# Patient Record
Sex: MALE | Race: WHITE | Employment: UNEMPLOYED | URBAN - METROPOLITAN AREA
[De-identification: names, ages, dates, MRNs, and addresses within clinical notes are randomized per-mention and may not be internally consistent; named-entity substitution may affect disease eponyms.]

---

## 2018-03-22 RX ORDER — DOCUSATE SODIUM 100 MG/1
100 CAPSULE, LIQUID FILLED ORAL 2 TIMES DAILY
COMMUNITY

## 2018-03-22 RX ORDER — TAMSULOSIN HYDROCHLORIDE 0.4 MG/1
0.4 CAPSULE ORAL DAILY
COMMUNITY

## 2018-03-22 RX ORDER — SENNA PLUS 8.6 MG/1
1 TABLET ORAL DAILY
COMMUNITY

## 2018-03-22 RX ORDER — ATORVASTATIN CALCIUM 80 MG/1
80 TABLET, FILM COATED ORAL DAILY
COMMUNITY

## 2018-03-22 RX ORDER — M-VIT,TX,IRON,MINS/CALC/FOLIC 27MG-0.4MG
1 TABLET ORAL DAILY
COMMUNITY

## 2018-03-23 ENCOUNTER — OFFICE VISIT (OUTPATIENT)
Dept: VASCULAR SURGERY | Age: 73
End: 2018-03-23

## 2018-03-23 VITALS
BODY MASS INDEX: 22.12 KG/M2 | HEIGHT: 71 IN | DIASTOLIC BLOOD PRESSURE: 64 MMHG | SYSTOLIC BLOOD PRESSURE: 110 MMHG | WEIGHT: 158 LBS

## 2018-03-23 DIAGNOSIS — I71.40 AAA (ABDOMINAL AORTIC ANEURYSM) WITHOUT RUPTURE: Primary | ICD-10-CM

## 2018-03-23 PROCEDURE — 1036F TOBACCO NON-USER: CPT | Performed by: SURGERY

## 2018-03-23 PROCEDURE — 1123F ACP DISCUSS/DSCN MKR DOCD: CPT | Performed by: SURGERY

## 2018-03-23 PROCEDURE — 3017F COLORECTAL CA SCREEN DOC REV: CPT | Performed by: SURGERY

## 2018-03-23 PROCEDURE — G8484 FLU IMMUNIZE NO ADMIN: HCPCS | Performed by: SURGERY

## 2018-03-23 PROCEDURE — G8420 CALC BMI NORM PARAMETERS: HCPCS | Performed by: SURGERY

## 2018-03-23 PROCEDURE — 4040F PNEUMOC VAC/ADMIN/RCVD: CPT | Performed by: SURGERY

## 2018-03-23 PROCEDURE — G8427 DOCREV CUR MEDS BY ELIG CLIN: HCPCS | Performed by: SURGERY

## 2018-03-23 PROCEDURE — 99203 OFFICE O/P NEW LOW 30 MIN: CPT | Performed by: SURGERY

## 2018-03-23 NOTE — PROGRESS NOTES
[facility])  Consultation/History & Physical    Date of Admission:  (Not on file)  Date of Consultation:  3/23/2018    PCP:  Maxx Caban MD     Chief Complaint:    Chief Complaint   Patient presents with    New Patient           History of Present Illness:  Kyle Casillas is a 68 y.o. male who presents with History of coronary artery disease, renal insufficiency, congestive heart failure, hypertension and hyperlipidemia. He presents in referral from his nursing facility where he is currently residing following a right hip fracture. Patient underwent a CT scan of the abdomen and pelvis without contrast for a history of hydronephrosis. On that CT scan. He was noted to have a 4.5 cm infrarenal abdominal aortic aneurysm and is referred here for further evaluation. The patient has a long history of tobacco abuse, however, does not currently smoke. No family history of aneurysmal disease. Denies abdominal or back pain. His progressing well through therapy at his extended nursing facility and should be released shortly. PMH:   has a past medical history of Arthritis; CAD (coronary artery disease); Cerebral artery occlusion with cerebral infarction [facility]); CHF (congestive heart failure) ([facility] Depression; Hyperlipidemia; Hypertension; Kidney stone; Prostate enlargement; Sleep apnea; and [facility] PSH:   has a past surgical history that includes knee surgery; Coronary artery bypass graft; Aortic valve replacement; Cholecystectomy; Cardiac surgery; and other surgical history (2015). Allergies:  No Known Allergies     Home Meds:    Prior to Admission medications    Medication Sig Start Date End Date Taking?  Authorizing Provider   Multiple Vitamins-Minerals (THERAPEUTIC MULTIVITAMIN-MINERALS) tablet Take 1 tablet by mouth daily   Yes Historical Provider, MD   atorvastatin (LIPITOR) 80 MG tablet Take 80 mg by mouth daily   Yes Historical Provider, MD   Calcium Carb-Cholecalciferol (CALCIUM-VITAMIN D) 500-200 MG-UNIT per tablet Take 1 tablet by mouth 2 times daily (with meals)   Yes Historical Provider, MD   senna (SENOKOT) 8.6 MG tablet Take 1 tablet by mouth daily   Yes Historical Provider, MD   tamsulosin (FLOMAX) 0.4 MG capsule Take 0.4 mg by mouth daily   Yes Historical Provider, MD   docusate sodium (COLACE) 100 MG capsule Take 100 mg by mouth 2 times daily   Yes Historical Provider, MD   apixaban (ELIQUIS) 5 MG TABS tablet Take by mouth 2 times daily   Yes Historical Provider, MD   ALBUTEROL SULFATE PO Take by mouth   Yes Historical Provider, MD   aspirin 81 MG tablet Take 81 mg by mouth daily   Yes Historical Provider, MD   Carvedilol (COREG PO) Take 25 mg by mouth daily Indications: takes 1/2 in the monrning and 1/2 at night    Yes Historical Provider, MD        Hospital Meds:    Current Outpatient Prescriptions   Medication Sig Dispense Refill    Multiple Vitamins-Minerals (THERAPEUTIC MULTIVITAMIN-MINERALS) tablet Take 1 tablet by mouth daily      atorvastatin (LIPITOR) 80 MG tablet Take 80 mg by mouth daily      Calcium Carb-Cholecalciferol (CALCIUM-VITAMIN D) 500-200 MG-UNIT per tablet Take 1 tablet by mouth 2 times daily (with meals)      senna (SENOKOT) 8.6 MG tablet Take 1 tablet by mouth daily      tamsulosin (FLOMAX) 0.4 MG capsule Take 0.4 mg by mouth daily      docusate sodium (COLACE) 100 MG capsule Take 100 mg by mouth 2 times daily      apixaban (ELIQUIS) 5 MG TABS tablet Take by mouth 2 times daily      ALBUTEROL SULFATE PO Take by mouth      aspirin 81 MG tablet Take 81 mg by mouth daily      Carvedilol (COREG PO) Take 25 mg by mouth daily Indications: takes 1/2 in the monrning and 1/2 at night        No current facility-administered medications for this visit.         Social History:       Social History     Social History    Marital status:      Spouse name: N/A    Number of children: N/A    Years of education: N/A     Social History Main Topics    Smoking status:

## 2018-08-30 ENCOUNTER — OFFICE VISIT (OUTPATIENT)
Dept: INFECTIOUS DISEASES | Age: 73
End: 2018-08-30

## 2018-08-30 VITALS — DIASTOLIC BLOOD PRESSURE: 58 MMHG | HEART RATE: 60 BPM | SYSTOLIC BLOOD PRESSURE: 84 MMHG | RESPIRATION RATE: 16 BRPM

## 2018-08-30 DIAGNOSIS — N39.0 COMPLICATED URINARY TRACT INFECTION: Primary | ICD-10-CM

## 2018-08-30 DIAGNOSIS — N39.0 URINARY TRACT INFECTION DUE TO PROTEUS: ICD-10-CM

## 2018-08-30 DIAGNOSIS — B96.4 URINARY TRACT INFECTION DUE TO PROTEUS: ICD-10-CM

## 2018-08-30 DIAGNOSIS — N40.1 BENIGN PROSTATIC HYPERPLASIA WITH URINARY RETENTION: ICD-10-CM

## 2018-08-30 DIAGNOSIS — Z87.891 EX-SMOKER: ICD-10-CM

## 2018-08-30 DIAGNOSIS — R33.8 BENIGN PROSTATIC HYPERPLASIA WITH URINARY RETENTION: ICD-10-CM

## 2018-08-30 DIAGNOSIS — Z87.442 HISTORY OF NEPHROLITHIASIS: ICD-10-CM

## 2018-08-30 DIAGNOSIS — B96.5 PSEUDOMONAS URINARY TRACT INFECTION: ICD-10-CM

## 2018-08-30 DIAGNOSIS — I25.83 CORONARY ARTERY DISEASE DUE TO LIPID RICH PLAQUE: ICD-10-CM

## 2018-08-30 DIAGNOSIS — I50.9 CHRONIC CONGESTIVE HEART FAILURE, UNSPECIFIED HEART FAILURE TYPE (HCC): ICD-10-CM

## 2018-08-30 DIAGNOSIS — N39.0 PSEUDOMONAS URINARY TRACT INFECTION: ICD-10-CM

## 2018-08-30 DIAGNOSIS — N13.5 OBSTRUCTION OF RIGHT URETEROPELVIC JUNCTION (UPJ): ICD-10-CM

## 2018-08-30 DIAGNOSIS — E78.2 MIXED HYPERLIPIDEMIA: ICD-10-CM

## 2018-08-30 DIAGNOSIS — Z86.73 H/O: STROKE: ICD-10-CM

## 2018-08-30 DIAGNOSIS — I25.10 CORONARY ARTERY DISEASE DUE TO LIPID RICH PLAQUE: ICD-10-CM

## 2018-08-30 DIAGNOSIS — N39.0 COMPLICATED URINARY TRACT INFECTION: ICD-10-CM

## 2018-08-30 DIAGNOSIS — Z71.6 TOBACCO ABUSE COUNSELING: ICD-10-CM

## 2018-08-30 DIAGNOSIS — F32.A CHRONIC DEPRESSION: ICD-10-CM

## 2018-08-30 DIAGNOSIS — G47.33 OSA (OBSTRUCTIVE SLEEP APNEA): ICD-10-CM

## 2018-08-30 LAB
A/G RATIO: 0.9 (ref 1.1–2.2)
ALBUMIN SERPL-MCNC: 3.4 G/DL (ref 3.4–5)
ALP BLD-CCNC: 94 U/L (ref 40–129)
ALT SERPL-CCNC: 10 U/L (ref 10–40)
ANION GAP SERPL CALCULATED.3IONS-SCNC: 11 MMOL/L (ref 3–16)
AST SERPL-CCNC: 16 U/L (ref 15–37)
BASOPHILS ABSOLUTE: 0 K/UL (ref 0–0.2)
BASOPHILS RELATIVE PERCENT: 0.4 %
BILIRUB SERPL-MCNC: 0.4 MG/DL (ref 0–1)
BUN BLDV-MCNC: 18 MG/DL (ref 7–20)
CALCIUM SERPL-MCNC: 9.7 MG/DL (ref 8.3–10.6)
CHLORIDE BLD-SCNC: 102 MMOL/L (ref 99–110)
CO2: 29 MMOL/L (ref 21–32)
CREAT SERPL-MCNC: 0.9 MG/DL (ref 0.8–1.3)
EOSINOPHILS ABSOLUTE: 0.1 K/UL (ref 0–0.6)
EOSINOPHILS RELATIVE PERCENT: 1.9 %
GFR AFRICAN AMERICAN: >60
GFR NON-AFRICAN AMERICAN: >60
GLOBULIN: 4 G/DL
GLUCOSE BLD-MCNC: 175 MG/DL (ref 70–99)
HCT VFR BLD CALC: 36.1 % (ref 40.5–52.5)
HEMOGLOBIN: 11.7 G/DL (ref 13.5–17.5)
LYMPHOCYTES ABSOLUTE: 1.6 K/UL (ref 1–5.1)
LYMPHOCYTES RELATIVE PERCENT: 24.1 %
MCH RBC QN AUTO: 28.3 PG (ref 26–34)
MCHC RBC AUTO-ENTMCNC: 32.4 G/DL (ref 31–36)
MCV RBC AUTO: 87.5 FL (ref 80–100)
MONOCYTES ABSOLUTE: 0.5 K/UL (ref 0–1.3)
MONOCYTES RELATIVE PERCENT: 7 %
NEUTROPHILS ABSOLUTE: 4.4 K/UL (ref 1.7–7.7)
NEUTROPHILS RELATIVE PERCENT: 66.6 %
PDW BLD-RTO: 15.3 % (ref 12.4–15.4)
PLATELET # BLD: 244 K/UL (ref 135–450)
PMV BLD AUTO: 10.8 FL (ref 5–10.5)
POTASSIUM SERPL-SCNC: 4.3 MMOL/L (ref 3.5–5.1)
RBC # BLD: 4.13 M/UL (ref 4.2–5.9)
SODIUM BLD-SCNC: 142 MMOL/L (ref 136–145)
TOTAL PROTEIN: 7.4 G/DL (ref 6.4–8.2)
WBC # BLD: 6.7 K/UL (ref 4–11)

## 2018-08-30 PROCEDURE — 99205 OFFICE O/P NEW HI 60 MIN: CPT | Performed by: INTERNAL MEDICINE

## 2018-08-30 RX ORDER — LACTOBACILLUS RHAMNOSUS GG 10B CELL
1 CAPSULE ORAL DAILY
Qty: 15 CAPSULE | Refills: 0 | Status: SHIPPED | OUTPATIENT
Start: 2018-08-30 | End: 2018-09-14

## 2018-08-30 RX ORDER — CIPROFLOXACIN 500 MG/1
500 TABLET, FILM COATED ORAL 2 TIMES DAILY
Qty: 28 TABLET | Refills: 0 | Status: SHIPPED | OUTPATIENT
Start: 2018-08-30 | End: 2018-09-13

## 2018-08-30 RX ORDER — CEFUROXIME AXETIL 500 MG/1
500 TABLET ORAL 2 TIMES DAILY
Qty: 28 TABLET | Refills: 0 | Status: SHIPPED | OUTPATIENT
Start: 2018-08-30 | End: 2018-09-13

## 2018-08-30 ASSESSMENT — ENCOUNTER SYMPTOMS
ABDOMINAL PAIN: 0
BACK PAIN: 0
WHEEZING: 0
DOUBLE VISION: 0
EYE DISCHARGE: 0
SHORTNESS OF BREATH: 0
DIARRHEA: 0
COUGH: 0
SORE THROAT: 0
HEMOPTYSIS: 0
BLURRED VISION: 0
NAUSEA: 0
SPUTUM PRODUCTION: 0
EYE REDNESS: 0
CONSTIPATION: 0

## 2018-08-30 NOTE — LETTER
[facility] PSYCHIATRIC Infectious Disease  [address] [facility],[address] [facility]  Phone: [phone]  Fax: [phone]    Steven Peter MD        August 30, 2018     Fabian Patel MD  [place] [address]  [address] [facility] [address]    Patient: Carrie Abdullahi  MR Number: [id number]  YOB: 1945  Date of Visit: 8/30/2018    Dear Dr. Fabian Patel: Thank you for the request for consultation for Carrie Abdullahi to me for the evaluation of . Recurrent UTI Below are the relevant portions of my assessment and plan of care. If you have questions, please do not hesitate to call me. I look forward to following Marc Mcneil along with you.     Sincerely,        Steven Peter MD

## 2018-08-30 NOTE — PROGRESS NOTES
Transcatheter Aortic Valve replacement        Current Medications: All medications were reviewed by me during this visit  Current Outpatient Prescriptions   Medication Sig Dispense Refill    cefUROXime (CEFTIN) 500 MG tablet Take 1 tablet by mouth 2 times daily for 14 days 28 tablet 0    ciprofloxacin (CIPRO) 500 MG tablet Take 1 tablet by mouth 2 times daily for 14 days 28 tablet 0    lactobacillus (CULTURELLE) capsule Take 1 capsule by mouth daily for 15 days 15 capsule 0    Multiple Vitamins-Minerals (THERAPEUTIC MULTIVITAMIN-MINERALS) tablet Take 1 tablet by mouth daily      atorvastatin (LIPITOR) 80 MG tablet Take 80 mg by mouth daily      Calcium Carb-Cholecalciferol (CALCIUM-VITAMIN D) 500-200 MG-UNIT per tablet Take 1 tablet by mouth 2 times daily (with meals)      tamsulosin (FLOMAX) 0.4 MG capsule Take 0.4 mg by mouth daily      apixaban (ELIQUIS) 5 MG TABS tablet Take by mouth 2 times daily      aspirin 81 MG tablet Take 81 mg by mouth daily      senna (SENOKOT) 8.6 MG tablet Take 1 tablet by mouth daily      docusate sodium (COLACE) 100 MG capsule Take 100 mg by mouth 2 times daily      ALBUTEROL SULFATE PO Take by mouth      Carvedilol (COREG PO) Take 25 mg by mouth daily Indications: takes 1/2 in the monrning and 1/2 at night        No current facility-administered medications for this visit. Family history: All family history was reviewed by me today    Family History   Problem Relation Age of Onset    Heart Attack Mother     Heart Disease Father        No family history of immunocompromising or autoimmune conditions. No h/o TB in in family or contacts    Social History:  All social and epidemiologic history was reviewed and updated be me in detail today.     Social History     Social History    Marital status:      Spouse name: N/A    Number of children: N/A    Years of education: N/A     Social History Main Topics    Smoking status: Former Smoker     Packs/day: thyromegaly present. Cardiovascular: Normal rate, regular rhythm and normal heart sounds. Exam reveals no friction rub. No murmur heard. Pulmonary/Chest: No stridor. No respiratory distress. He has no wheezes. He has no rales. Abdominal: Soft. Bowel sounds are normal. There is no tenderness. There is no rebound and no guarding. Musculoskeletal: Normal range of motion. He exhibits no edema or tenderness. Lymphadenopathy:     He has no cervical adenopathy. He has no axillary adenopathy. Neurological: He is alert and oriented to person, place, and time. He exhibits normal muscle tone. Skin: Skin is warm and dry. No rash noted. He is not diaphoretic. No erythema. Psychiatric: He has a normal mood and affect. Nursing note and vitals reviewed. DATA:  All available lab data was reviewed by me during this visit    Last CBC:  Lab Results   Component Value Date    WBC 9.3 11/04/2016    HGB 14.5 11/04/2016    HCT 43.8 11/04/2016    MCV 92.2 11/04/2016     11/04/2016    LYMPHOPCT 20.0 11/04/2016    RBC 4.75 11/04/2016    MCH 30.5 11/04/2016    MCHC 33.1 11/04/2016    RDW 13.3 11/04/2016       Last BMP:  Lab Results   Component Value Date     11/04/2016    K 3.7 11/04/2016     11/04/2016    CO2 25 11/04/2016    BUN 20 11/04/2016    CREATININE 0.9 11/04/2016    GLUCOSE 114 11/04/2016    CALCIUM 9.0 11/04/2016        Hepatic Function Panel:   Lab Results   Component Value Date    ALKPHOS 93 11/04/2016    ALT 7 11/04/2016    AST 12 11/04/2016    PROT 7.2 11/04/2016    BILITOT 0.4 11/04/2016    LABALBU 3.8 11/04/2016       Last CK: No results found for: CKTOTAL    Last ESR:  No results found for: SEDRATE    Last CRP:  No results found for: CRP    1. Imaging: All pertinent images and reports for the current visit were reviewed by me during this visit.     Outside records:    Labs, Microbiology, Radiology and pertinent results from Care everywhere, if available, were reviewed as a part of the consultation. Allergies: All allergies data was reviewed by me during this visit  Patient has no known allergies. Microbiology:   All available micro data was reviewed by me during this visit    · Urine culture  - collected on 7/6/18 : > 100,000 cfu/ml of Pseudomonas, Proteus mirabilis        Problem list:       Patient Active Problem List   Diagnosis Code    Complicated urinary tract infection N39.0    Pseudomonas urinary tract infection N39.0, B96.5    Urinary tract infection due to Proteus N39.0, B96.4    H/O: stroke Z86.73    Coronary artery disease due to lipid rich plaque I25.10, I25.83    Benign prostatic hyperplasia with urinary retention N40.1, R33.8    History of nephrolithiasis Z87.442    Chronic congestive heart failure (HCC) I50.9    Chronic depression F32.9    Mixed hyperlipidemia E78.2    CHANI (obstructive sleep apnea) G47.33         IMPRESSION:    The patient is a 68 y.o. old male who  has a past medical history of Arthritis; CAD (coronary artery disease); Cerebral artery occlusion with cerebral infarction [facility]); CHF (congestive heart failure) ([facility] Depression; Hyperlipidemia; Hypertension; Kidney stone; Prostate enlargement; Sleep apnea; and St[facility] ([facility] was seen today for following problems:    1. Complicated urinary tract infection    2. Pseudomonas urinary tract infection    3. Urinary tract infection due to Proteus    4. H/O: stroke    5. Coronary artery disease due to lipid rich plaque    6. Benign prostatic hyperplasia with urinary retention    7. History of nephrolithiasis    8. Chronic congestive heart failure, unspecified heart failure type ([facility]    9. Chronic depression    10. Mixed hyperlipidemia    11. CHANI (obstructive sleep apnea)    12. Obstruction of right ureteropelvic junction (UPJ)    13. Tobacco abuse counseling    14.  Ex-smoker        Discussion:      I reviewed the susceptibilities of Pseudomonas and Proteus mirabilis isolates from his urine culture that was done on 7/6/18 (see the scanned document underlying media section in EPIC from 7/27/18)    Pseudomonas was actually ciprofloxacin susceptible. It was insensitive to gentamicin. Proteus mirabilis isolate was resistant to ciprofloxacin, nitrofurantoin and Macrobid. It was however susceptible to cefuroxime and cefoxitin. I have reviewed all of his recent and past medical records and all of his previous cultures for past 5 years from that they can care everywhere. I reviewed the CT scan of his abdomen and pelvis done on 7/26/18 from care everywhere. He has a right ureteropelvic junction obstruction and bilateral nephrolithiasis which explains his recurrent UTI      RECOMMENDATIONS:      Diagnostic Workup:    · Will order repeat urine culture today  · Continue to follow fever curve at home. · Will order baseline CBC and CMP    Antimicrobials:    · Will order oral ciprofloxacin 500 mg every 12 hours  · Will order oral cefuroxime 500 mg q 12 hours   · I have asked the patient to get above tests done today and wait for the results. · He was instructed to call my office next Monday. If his urine culture is positive, only then he should actually start taking the above antibiotics. Will plan to continue both antibiotics for 2 weeks  · Will order probiotics for him for 2 weeks   · Discussed the importance of maintaining good hydration        Labs ordered today in EPIC:    Orders Placed This Encounter   Procedures    URINE CULTURE     Standing Status:   Future     Standing Expiration Date:   8/30/2019     Order Specific Question:   Specify (ex-cath, midstream, cysto, etc)?      Answer:   Midstream    CBC Auto Differential     Standing Status:   Future     Standing Expiration Date:   8/30/2019    Comprehensive Metabolic Panel     Standing Status:   Future     Standing Expiration Date:   8/30/2019       Medications ordered today in EPIC:    Orders Placed This Encounter help lines like 1-800-QUIT-NOW and 1-800-LUNG-[place]. Follow-up:    · Follow-up with me in ID clinic when necessary      TIME SPENT TODAY:     - Spent over 62 minutes on visit (including interval history, physical exam, review of data including labs, cultures, imaging, development and implementation of treatment plan and coordination of care). - Over 50% of time spent with pt counseling and education. Please note that this chart was generated using Dragon dictation software. Although every effort was made to ensure the accuracy of this automated transcription, some errors in transcription may have occurred inadvertently. If you may need any clarification, please do not hesitate to contact me through EPIC or at the phone number provided below with my electronic signature. Thankyou for involving me in the care of your patient. If you have any additional questions, please do not hesitate to contact me.     Jordi Fulton MD, MPH  8/30/2018, 10:33 AM  [facility] Infectious Disease   Office: [phone]  Fax: [phone]  Tuesday AM clinic:   [address] [place] [address]  Thursday AM clinic: [address] [facility]

## 2018-09-02 LAB
ORGANISM: ABNORMAL
URINE CULTURE, ROUTINE: ABNORMAL
URINE CULTURE, ROUTINE: ABNORMAL

## 2018-09-03 ENCOUNTER — TELEPHONE (OUTPATIENT)
Dept: INFECTIOUS DISEASES | Age: 73
End: 2018-09-03

## 2018-09-04 ENCOUNTER — TELEPHONE (OUTPATIENT)
Dept: INFECTIOUS DISEASES | Age: 73
End: 2018-09-04